# Patient Record
Sex: FEMALE | Race: BLACK OR AFRICAN AMERICAN | Employment: PART TIME | URBAN - METROPOLITAN AREA
[De-identification: names, ages, dates, MRNs, and addresses within clinical notes are randomized per-mention and may not be internally consistent; named-entity substitution may affect disease eponyms.]

---

## 2018-01-26 PROBLEM — Z34.90 PREGNANCY: Status: ACTIVE | Noted: 2018-01-26

## 2018-01-29 PROBLEM — O26.899 RH NEGATIVE STATUS DURING PREGNANCY: Status: ACTIVE | Noted: 2018-01-29

## 2018-01-29 PROBLEM — Z67.91 RH NEGATIVE STATUS DURING PREGNANCY: Status: ACTIVE | Noted: 2018-01-29

## 2018-07-07 ENCOUNTER — ANESTHESIA (OUTPATIENT)
Dept: LABOR AND DELIVERY | Age: 28
End: 2018-07-07
Payer: COMMERCIAL

## 2018-07-07 ENCOUNTER — HOSPITAL ENCOUNTER (INPATIENT)
Age: 28
LOS: 2 days | Discharge: HOME | End: 2018-07-09
Attending: OBSTETRICS & GYNECOLOGY | Admitting: OBSTETRICS & GYNECOLOGY
Payer: COMMERCIAL

## 2018-07-07 ENCOUNTER — ANESTHESIA EVENT (OUTPATIENT)
Dept: LABOR AND DELIVERY | Age: 28
End: 2018-07-07
Payer: COMMERCIAL

## 2018-07-07 PROBLEM — Z37.9 NORMAL LABOR: Status: ACTIVE | Noted: 2018-07-07

## 2018-07-07 LAB
ABO + RH BLD: NORMAL
BASE DEFICIT BLDCOA-SCNC: 4.5 MMOL/L (ref 0–2)
BASE DEFICIT BLDCOV-SCNC: 3.5 MMOL/L (ref 1.9–7.7)
BDY SITE: ABNORMAL
BDY SITE: ABNORMAL
BLOOD BANK CMNT PATIENT-IMP: NORMAL
BLOOD GROUP ANTIBODIES SERPL: NORMAL
ERYTHROCYTE [DISTWIDTH] IN BLOOD BY AUTOMATED COUNT: 13.6 % (ref 11.9–14.6)
FETAL SCREEN,FMHS: NORMAL
HCO3 BLDCOA-SCNC: 24 MMOL/L (ref 22–26)
HCO3 BLDV-SCNC: 22 MMOL/L
HCT VFR BLD AUTO: 32.9 % (ref 35.8–46.3)
HGB BLD-MCNC: 10.5 G/DL (ref 11.7–15.4)
MCH RBC QN AUTO: 24.4 PG (ref 26.1–32.9)
MCHC RBC AUTO-ENTMCNC: 31.9 G/DL (ref 31.4–35)
MCV RBC AUTO: 76.3 FL (ref 79.6–97.8)
PCO2 BLDCOA: 58 MMHG (ref 33–49)
PCO2 BLDCOV: 41 MMHG (ref 14.1–43.3)
PH BLDCOA: 7.23 [PH] (ref 7.21–7.31)
PH BLDCOV: 7.34 [PH] (ref 7.2–7.44)
PLATELET # BLD AUTO: 226 K/UL (ref 150–450)
PMV BLD AUTO: 12.2 FL (ref 10.8–14.1)
PO2 BLDCOA: <10 MMHG (ref 9–19)
PO2 BLDV: 18 MMHG (ref 30.4–57.2)
RBC # BLD AUTO: 4.31 M/UL (ref 4.05–5.25)
SERVICE CMNT-IMP: ABNORMAL
SERVICE CMNT-IMP: ABNORMAL
SPECIMEN EXP DATE BLD: NORMAL
WBC # BLD AUTO: 10.8 K/UL (ref 4.3–11.1)

## 2018-07-07 PROCEDURE — 74011250636 HC RX REV CODE- 250/636

## 2018-07-07 PROCEDURE — 76060000078 HC EPIDURAL ANESTHESIA: Performed by: OBSTETRICS & GYNECOLOGY

## 2018-07-07 PROCEDURE — 75410000003 HC RECOV DEL/VAG/CSECN EA 0.5 HR: Performed by: OBSTETRICS & GYNECOLOGY

## 2018-07-07 PROCEDURE — 36415 COLL VENOUS BLD VENIPUNCTURE: CPT | Performed by: OBSTETRICS & GYNECOLOGY

## 2018-07-07 PROCEDURE — 10907ZC DRAINAGE OF AMNIOTIC FLUID, THERAPEUTIC FROM PRODUCTS OF CONCEPTION, VIA NATURAL OR ARTIFICIAL OPENING: ICD-10-PCS | Performed by: OBSTETRICS & GYNECOLOGY

## 2018-07-07 PROCEDURE — 75410000000 HC DELIVERY VAGINAL/SINGLE: Performed by: OBSTETRICS & GYNECOLOGY

## 2018-07-07 PROCEDURE — 74011258636 HC RX REV CODE- 258/636: Performed by: OBSTETRICS & GYNECOLOGY

## 2018-07-07 PROCEDURE — 77030014125 HC TY EPDRL BBMI -B: Performed by: NURSE ANESTHETIST, CERTIFIED REGISTERED

## 2018-07-07 PROCEDURE — A4300 CATH IMPL VASC ACCESS PORTAL: HCPCS | Performed by: NURSE ANESTHETIST, CERTIFIED REGISTERED

## 2018-07-07 PROCEDURE — 82803 BLOOD GASES ANY COMBINATION: CPT

## 2018-07-07 PROCEDURE — 75410000002 HC LABOR FEE PER 1 HR: Performed by: OBSTETRICS & GYNECOLOGY

## 2018-07-07 PROCEDURE — 4A1HXCZ MONITORING OF PRODUCTS OF CONCEPTION, CARDIAC RATE, EXTERNAL APPROACH: ICD-10-PCS | Performed by: OBSTETRICS & GYNECOLOGY

## 2018-07-07 PROCEDURE — 86900 BLOOD TYPING SEROLOGIC ABO: CPT | Performed by: OBSTETRICS & GYNECOLOGY

## 2018-07-07 PROCEDURE — 74011000250 HC RX REV CODE- 250

## 2018-07-07 PROCEDURE — 74011250637 HC RX REV CODE- 250/637: Performed by: OBSTETRICS & GYNECOLOGY

## 2018-07-07 PROCEDURE — 99283 EMERGENCY DEPT VISIT LOW MDM: CPT

## 2018-07-07 PROCEDURE — 85461 HEMOGLOBIN FETAL: CPT | Performed by: OBSTETRICS & GYNECOLOGY

## 2018-07-07 PROCEDURE — 74011250636 HC RX REV CODE- 250/636: Performed by: OBSTETRICS & GYNECOLOGY

## 2018-07-07 PROCEDURE — 85027 COMPLETE CBC AUTOMATED: CPT | Performed by: OBSTETRICS & GYNECOLOGY

## 2018-07-07 PROCEDURE — 65270000029 HC RM PRIVATE

## 2018-07-07 PROCEDURE — 59025 FETAL NON-STRESS TEST: CPT

## 2018-07-07 RX ORDER — EPHEDRINE SULFATE 50 MG/ML
INJECTION, SOLUTION INTRAVENOUS AS NEEDED
Status: DISCONTINUED | OUTPATIENT
Start: 2018-07-07 | End: 2018-07-07 | Stop reason: HOSPADM

## 2018-07-07 RX ORDER — DOCUSATE SODIUM 100 MG/1
100 CAPSULE, LIQUID FILLED ORAL 2 TIMES DAILY
Status: DISCONTINUED | OUTPATIENT
Start: 2018-07-07 | End: 2018-07-09 | Stop reason: HOSPADM

## 2018-07-07 RX ORDER — ONDANSETRON 2 MG/ML
4 INJECTION INTRAMUSCULAR; INTRAVENOUS
Status: DISCONTINUED | OUTPATIENT
Start: 2018-07-07 | End: 2018-07-07 | Stop reason: HOSPADM

## 2018-07-07 RX ORDER — SODIUM CHLORIDE 0.9 % (FLUSH) 0.9 %
5-10 SYRINGE (ML) INJECTION EVERY 8 HOURS
Status: DISCONTINUED | OUTPATIENT
Start: 2018-07-07 | End: 2018-07-07 | Stop reason: HOSPADM

## 2018-07-07 RX ORDER — OXYTOCIN/0.9 % SODIUM CHLORIDE 15/250 ML
250 PLASTIC BAG, INJECTION (ML) INTRAVENOUS ONCE
Status: DISCONTINUED | OUTPATIENT
Start: 2018-07-07 | End: 2018-07-07

## 2018-07-07 RX ORDER — LIDOCAINE HYDROCHLORIDE 10 MG/ML
1 INJECTION INFILTRATION; PERINEURAL
Status: DISCONTINUED | OUTPATIENT
Start: 2018-07-07 | End: 2018-07-07 | Stop reason: HOSPADM

## 2018-07-07 RX ORDER — FAMOTIDINE 20 MG/1
20 TABLET, FILM COATED ORAL ONCE
Status: DISCONTINUED | OUTPATIENT
Start: 2018-07-07 | End: 2018-07-07 | Stop reason: HOSPADM

## 2018-07-07 RX ORDER — ONDANSETRON 4 MG/1
4 TABLET, ORALLY DISINTEGRATING ORAL
Status: ACTIVE | OUTPATIENT
Start: 2018-07-07 | End: 2018-07-08

## 2018-07-07 RX ORDER — DEXTROSE, SODIUM CHLORIDE, SODIUM LACTATE, POTASSIUM CHLORIDE, AND CALCIUM CHLORIDE 5; .6; .31; .03; .02 G/100ML; G/100ML; G/100ML; G/100ML; G/100ML
125 INJECTION, SOLUTION INTRAVENOUS CONTINUOUS
Status: DISCONTINUED | OUTPATIENT
Start: 2018-07-07 | End: 2018-07-07

## 2018-07-07 RX ORDER — CHLOROPROCAINE HYDROCHLORIDE 20 MG/ML
INJECTION, SOLUTION EPIDURAL; INFILTRATION; INTRACAUDAL; PERINEURAL AS NEEDED
Status: DISCONTINUED | OUTPATIENT
Start: 2018-07-07 | End: 2018-07-07 | Stop reason: HOSPADM

## 2018-07-07 RX ORDER — IBUPROFEN 400 MG/1
400 TABLET ORAL
Status: DISCONTINUED | OUTPATIENT
Start: 2018-07-07 | End: 2018-07-09 | Stop reason: HOSPADM

## 2018-07-07 RX ORDER — HYDROCODONE BITARTRATE AND ACETAMINOPHEN 5; 325 MG/1; MG/1
1 TABLET ORAL
Status: DISCONTINUED | OUTPATIENT
Start: 2018-07-07 | End: 2018-07-09 | Stop reason: HOSPADM

## 2018-07-07 RX ORDER — DIPHENHYDRAMINE HCL 25 MG
25 CAPSULE ORAL
Status: DISCONTINUED | OUTPATIENT
Start: 2018-07-07 | End: 2018-07-09 | Stop reason: HOSPADM

## 2018-07-07 RX ORDER — MINERAL OIL
120 OIL (ML) ORAL
Status: DISCONTINUED | OUTPATIENT
Start: 2018-07-07 | End: 2018-07-07 | Stop reason: HOSPADM

## 2018-07-07 RX ORDER — LIDOCAINE HYDROCHLORIDE 20 MG/ML
JELLY TOPICAL
Status: DISCONTINUED | OUTPATIENT
Start: 2018-07-07 | End: 2018-07-07 | Stop reason: HOSPADM

## 2018-07-07 RX ORDER — OXYCODONE AND ACETAMINOPHEN 7.5; 325 MG/1; MG/1
1 TABLET ORAL
Status: DISCONTINUED | OUTPATIENT
Start: 2018-07-07 | End: 2018-07-09 | Stop reason: HOSPADM

## 2018-07-07 RX ORDER — SIMETHICONE 80 MG
80 TABLET,CHEWABLE ORAL
Status: DISCONTINUED | OUTPATIENT
Start: 2018-07-07 | End: 2018-07-09 | Stop reason: HOSPADM

## 2018-07-07 RX ORDER — SODIUM CHLORIDE 0.9 % (FLUSH) 0.9 %
5-10 SYRINGE (ML) INJECTION AS NEEDED
Status: DISCONTINUED | OUTPATIENT
Start: 2018-07-07 | End: 2018-07-07 | Stop reason: HOSPADM

## 2018-07-07 RX ORDER — SODIUM CHLORIDE 0.9 % (FLUSH) 0.9 %
5-10 SYRINGE (ML) INJECTION AS NEEDED
Status: DISCONTINUED | OUTPATIENT
Start: 2018-07-07 | End: 2018-07-07

## 2018-07-07 RX ORDER — OXYTOCIN/RINGER'S LACTATE 30/500 ML
.5-2 PLASTIC BAG, INJECTION (ML) INTRAVENOUS
Status: DISCONTINUED | OUTPATIENT
Start: 2018-07-07 | End: 2018-07-09 | Stop reason: HOSPADM

## 2018-07-07 RX ORDER — BUTORPHANOL TARTRATE 1 MG/ML
1 INJECTION INTRAMUSCULAR; INTRAVENOUS
Status: DISCONTINUED | OUTPATIENT
Start: 2018-07-07 | End: 2018-07-07 | Stop reason: HOSPADM

## 2018-07-07 RX ORDER — OXYTOCIN/0.9 % SODIUM CHLORIDE 15/250 ML
250 PLASTIC BAG, INJECTION (ML) INTRAVENOUS ONCE
Status: ACTIVE | OUTPATIENT
Start: 2018-07-07 | End: 2018-07-08

## 2018-07-07 RX ORDER — PROMETHAZINE HYDROCHLORIDE 25 MG/1
25 TABLET ORAL
Status: DISCONTINUED | OUTPATIENT
Start: 2018-07-07 | End: 2018-07-09 | Stop reason: HOSPADM

## 2018-07-07 RX ORDER — IBUPROFEN 800 MG/1
800 TABLET ORAL
Status: DISCONTINUED | OUTPATIENT
Start: 2018-07-07 | End: 2018-07-09 | Stop reason: HOSPADM

## 2018-07-07 RX ORDER — SODIUM CHLORIDE 0.9 % (FLUSH) 0.9 %
5-10 SYRINGE (ML) INJECTION EVERY 8 HOURS
Status: DISCONTINUED | OUTPATIENT
Start: 2018-07-07 | End: 2018-07-07

## 2018-07-07 RX ORDER — PRENATAL VIT 96/IRON FUM/FOLIC 27MG-0.8MG
1 TABLET ORAL DAILY
Status: DISCONTINUED | OUTPATIENT
Start: 2018-07-08 | End: 2018-07-09 | Stop reason: HOSPADM

## 2018-07-07 RX ADMIN — EPHEDRINE SULFATE 10 MG: 50 INJECTION, SOLUTION INTRAVENOUS at 17:15

## 2018-07-07 RX ADMIN — SODIUM CHLORIDE, SODIUM LACTATE, POTASSIUM CHLORIDE, AND CALCIUM CHLORIDE 1000 ML: 600; 310; 30; 20 INJECTION, SOLUTION INTRAVENOUS at 16:09

## 2018-07-07 RX ADMIN — SODIUM CHLORIDE, SODIUM LACTATE, POTASSIUM CHLORIDE, CALCIUM CHLORIDE, AND DEXTROSE MONOHYDRATE 125 ML/HR: 600; 310; 30; 20; 5 INJECTION, SOLUTION INTRAVENOUS at 16:09

## 2018-07-07 RX ADMIN — EPHEDRINE SULFATE 10 MG: 50 INJECTION, SOLUTION INTRAVENOUS at 17:12

## 2018-07-07 RX ADMIN — IBUPROFEN 800 MG: 800 TABLET ORAL at 19:32

## 2018-07-07 RX ADMIN — CHLOROPROCAINE HYDROCHLORIDE 10 ML: 20 INJECTION, SOLUTION EPIDURAL; INFILTRATION; INTRACAUDAL; PERINEURAL at 16:48

## 2018-07-07 RX ADMIN — EPHEDRINE SULFATE 15 MG: 50 INJECTION, SOLUTION INTRAVENOUS at 17:07

## 2018-07-07 NOTE — IP AVS SNAPSHOT
[id number] 
[phone] Patient: Simon Brunson MRN: [id number] AYA:[id number]/71/[id number] About your hospitalization You were admitted on:  July 7, 2018 You last received care in the:  [address] [facility] You were discharged on:  July 9, 2018 Why you were hospitalized Your primary diagnosis was:  Shortness Of Breath Your diagnoses also included:  Normal Labor Follow-up Information Follow up With Details Comments Contact Info None   None ([phone]) Patient stated that they have no PCP Galina Ford MD In 2 weeks Call and schedule an appointment for a 2 week follow up at [address] [facility] [address] [facility] [address] [phone] Discharge Orders None A check heather indicates which time of day the medication should be taken. My Medications START taking these medications Instructions Each Dose to Equal  
 Morning Noon Evening Bedtime  
 ibuprofen 800 mg tablet Commonly known as:  MOTRIN Your last dose was: Your next dose is: Take 1 Tab by mouth every six (6) hours as needed. 800 mg CONTINUE taking these medications Instructions Each Dose to Equal  
 Morning Noon Evening Bedtime PRENATAL DHA+COMPLETE PRENATAL -300 mg-mcg-mg Cmpk Generic drug:  [id number] kim-folic-dha Your last dose was: Your next dose is: Take  by mouth. Where to Get Your Medications These medications were sent to [address] [place] [address] AT [facility] of [address] ( [address]) & [address] [address] Phone:  [phone]  
  ibuprofen 800 mg tablet Discharge Instructions Vaginal Childbirth: Care Instructions Your Care Instructions Your body will slowly heal in the next few weeks. It is easy to get too tired and overwhelmed during the first weeks after your baby is born. Changes in your hormones can shift your mood without warning. You may find it hard to meet the extra demands on your energy and time. Take it easy on yourself. Follow-up care is a key part of your treatment and safety. Be sure to make and go to all appointments, and call your doctor if you are having problems. It's also a good idea to know your test results and keep a list of the medicines you take. How can you care for yourself at home? · Vaginal bleeding and cramps ¨ After delivery, you will have a bloody discharge from the vagina. This will turn pink within a week and then white or yellow after about 10 days. It may last for 2 to 4 weeks or longer, until the uterus has healed. Use pads instead of tampons until you stop bleeding. ¨ Do not worry if you pass some blood clots, as long as they are smaller than a golf ball. If you have a tear or stitches in your vaginal area, change the pad at least every 4 hours to prevent soreness and infection. ¨ You may have cramps for the first few days after childbirth. These are normal and occur as the uterus shrinks to normal size. Take an over-the-counter pain medicine, such as acetaminophen (Tylenol), ibuprofen (Advil, Motrin), or naproxen (Aleve), for cramps. Read and follow all instructions on the label. Do not take aspirin, because it can cause more bleeding. ¨ Do not take two or more pain medicines at the same time unless the doctor told you to. Many pain medicines have acetaminophen, which is Tylenol. Too much acetaminophen (Tylenol) can be harmful. · Stitches ¨ If you have stitches, they will dissolve on their own and do not need to be removed. Follow your doctor's instructions for cleaning the stitched area.  
¨ Put ice or a cold pack on your painful area for 10 to 20 minutes at a time, several times a day, for the first few days. Put a thin cloth between the ice and your skin. ¨ Sit in a few inches of warm water (sitz bath) 3 times a day and after bowel movements. The warm water helps with pain and itching. If you do not have a tub, a warm shower might help. · Breast fullness ¨ Your breasts may overfill (engorge) in the first few days after delivery. To help milk flow and to relieve pain, warm your breasts in the shower or by using warm, moist towels before nursing. ¨ If you are not nursing, do not put warmth on your breasts or touch your breasts. Wear a tight bra or sports bra and use ice until the fullness goes away. This usually takes 2 to 3 days. ¨ Put ice or a cold pack on your breast after nursing to reduce swelling and pain. Put a thin cloth between the ice and your skin. · Activity ¨ Eat a balanced diet. Do not try to lose weight by cutting calories. Keep taking your prenatal vitamins, or take a multivitamin. ¨ Get as much rest as you can. Try to take naps when your baby sleeps during the day. ¨ Get some exercise every day. But do not do any heavy exercise until your doctor says it is okay. ¨ Wait until you are healed (about 4 to 6 weeks) before you have sexual intercourse. Your doctor will tell you when it is okay to have sex. ¨ Talk to your doctor about birth control. You can get pregnant even before your period returns. Also, you can get pregnant while you are breastfeeding. · Mental health ¨ It is normal to have some sadness, anxiety, sleeplessness, and mood swings after you go home. If you feel upset or hopeless for more than a few days or are having trouble doing the things you need to do, talk to your doctor. · Constipation and hemorrhoids ¨ Drink plenty of fluids, enough so that your urine is light yellow or clear like water.  If you have kidney, heart, or liver disease and have to limit fluids, talk with your doctor before you increase the amount of fluids you drink. ¨ Eat plenty of fiber each day. Have a bran muffin or bran cereal for breakfast, and try eating a piece of fruit for a mid-afternoon snack. ¨ For painful, itchy hemorrhoids, put ice or a cold pack on the area several times a day for 10 minutes at a time. Follow this by putting a warm compress on the area for another 10 to 20 minutes or by sitting in a shallow, warm bath. When should you call for help? Call 911 anytime you think you may need emergency care. For example, call if: 
? · You passed out (lost consciousness). ?Call your doctor now or seek immediate medical care if: 
? · You have severe vaginal bleeding. ? · You are dizzy or lightheaded, or you feel like you may faint. ? · You have a fever. ? · You have new or more pain in your belly or pelvis. ? Watch closely for changes in your health, and be sure to contact your doctor if: 
? · Your vaginal bleeding seems to be getting heavier. ? · You have new or worse vaginal discharge. ? · You feel sad, anxious, or hopeless for more than a few days. ? · You do not get better as expected. Where can you learn more? Go to http://reece-medardo.info/. Enter V880 in the search box to learn more about \"Vaginal Childbirth: Care Instructions. \" Current as of: March 16, 2017 Content Version: 11.4 © 1951-1536 [employer]. Care instructions adapted under license by [employer] (which disclaims liability or warranty for this information). If you have questions about a medical condition or this instruction, always ask your healthcare professional. [place] [address] any warranty or liability for your use of this information. [employer] Announcement We are excited to announce that we are making your provider's discharge notes available to you in [employer].   You will see these notes when they are completed and signed by the physician that discharged you from your recent hospital stay. If you have any questions or concerns about any information you see in Conergy, please call the Health Information Department where you were seen or reach out to your Primary Care Provider for more information about your plan of care. Introducing [facility]! Dear Wes Traylor: Thank you for requesting a Conergy account. Our records indicate that you already have an active Conergy account. You can access your account anytime at [email or web address]/"[email or web address] Did you know that you can access your hospital and ER discharge instructions at any time in Conergy? You can also review all of your test results from your hospital stay or ER visit. Additional Information If you have questions, please visit the Frequently Asked Questions section of the Conergy website at [email or web address] Remember, Conergy is NOT to be used for urgent needs. For medical emergencies, dial 911. Now available from your iPhone and Android! Introducing Luís Garner As a [employer] patient, I wanted to make you aware of our electronic visit tool called Luís Garner. [employer] 24/7 allows you to connect within minutes with a medical provider 24 hours a day, seven days a week via a mobile device or tablet or logging into a secure website from your computer. You can access Luís Garner from anywhere in the [place]. A virtual visit might be right for you when you have a simple condition and feel like you just dont want to get out of bed, or cant get away from work for an appointment, when your regular [employer] provider is not available (evenings, weekends or holidays), or when youre out of town and need minor care. Electronic visits cost only $49 and if the [employer] 24/7 provider determines a prescription is needed to treat your condition, one can be electronically transmitted to a nearby pharmacy*. Please take a moment to enroll today if you have not already done so. The enrollment process is free and takes just a few minutes. To enroll, please download the [employer] 24/7 rico to your tablet or phone, or visit [email or web address] to enroll on your computer. And, as an [address] [place] [address] patient with a raksul account, the results of your visits will be scanned into your electronic medical record and your primary care provider will be able to view the scanned results. We urge you to continue to see your regular [employer] provider for your ongoing medical care. And while your primary care provider may not be the one available when you seek a Inaura virtual visit, the peace of mind you get from getting a real diagnosis real time can be priceless. For more information on Inaura, view our Frequently Asked Questions (FAQs) at [email or web address]. Sincerely, 
 
Abdirashid Erazo MD 
Chief Medical Officer [address] [place] [address] *:  certain medications cannot be prescribed via Inaura Providers Seen During Your Hospitalization Provider Specialty Primary office phone Vira Barajas MD Obstetrics & Gynecology [phone] Immunizations Administered for This Admission Name Date Rho(D) Immune Globulin - IM 7/8/2018 Your Primary Care Physician (PCP) Primary Care Physician Office Phone Office Fax NONE ** None ** ** None ** You are allergic to the following No active allergies Recent Documentation Height Weight Breastfeeding? BMI OB Status Smoking Status 1.575 m 74.4 kg Unknown 30 kg/m2 Recent pregnancy Never Smoker Emergency Contacts Name Discharge Info Relation Home Work Mobile Autumn Chun  Spouse [phone] Patient Belongings The following personal items are in your possession at time of discharge: Please provide this summary of care documentation to your next provider. Signatures-by signing, you are acknowledging that this After Visit Summary has been reviewed with you and you have received a copy. Patient Signature:  ____________________________________________________________ Date:  ____________________________________________________________  
  
Sofiya Moulds Provider Signature:  ____________________________________________________________ Date:  ____________________________________________________________

## 2018-07-07 NOTE — ANESTHESIA PREPROCEDURE EVALUATION
Anesthetic History Review of Systems / Medical History Patient summary reviewed, nursing notes reviewed and pertinent labs reviewed Pulmonary Neuro/Psych Cardiovascular Exercise tolerance: >4 METS 
  
GI/Hepatic/Renal 
  
 
 
 
 
 
 Endo/Other Other Findings Physical Exam 
 
Airway Mallampati: II 
TM Distance: 4 - 6 cm Neck ROM: normal range of motion Mouth opening: Normal 
 
 Cardiovascular Regular rate and rhythm,  S1 and S2 normal,  no murmur, click, rub, or gallop Dental 
No notable dental hx Pulmonary Breath sounds clear to auscultation Abdominal 
 
 
 
 Other Findings Anesthetic Plan ASA: 2 Anesthesia type: epidural 
 
 
 
 
Induction: Intravenous Anesthetic plan and risks discussed with: Patient

## 2018-07-07 NOTE — ANESTHESIA POSTPROCEDURE EVALUATION
Post-Anesthesia Evaluation and Assessment Patient: Dick Burnett MRN: [id number]  SSN: [id number] YOB: 1990  Age: 33 y.o. Sex: female Cardiovascular Function/Vital Signs Visit Vitals  /81 (BP 1 Location: Right arm, BP Patient Position: At rest;Sitting)  Pulse 68  Resp 18  Ht 5' 2\" (1.575 m)  Wt 74.4 kg (164 lb)  Breastfeeding Unknown  BMI 30 kg/m2 Patient is status post epidural anesthesia for * No procedures listed *. Nausea/Vomiting: None Postoperative hydration reviewed and adequate. Pain: 
Pain Scale 1: Numeric (0 - 10) (07/07/18 1532) Pain Intensity 1: 8 (07/07/18 1532) Managed Neurological Status: At baseline Mental Status and Level of Consciousness: Arousable Pulmonary Status:  
O2 Device: Room air (07/07/18 1532) Adequate oxygenation and airway patent Complications related to anesthesia: None Post-anesthesia assessment completed. No concerns Signed By: Emerita Esposito MD   
 July 7, 2018

## 2018-07-07 NOTE — L&D DELIVERY NOTE
Delivery Summary    Patient: Padmini Dong MRN: [id number]  SSN: [id number]    YOB: 1990  Age: 33 y.o. Sex: female       Information for the patient's :  Soledad Villasenor [[id number]]       Labor Events:    Labor: No   Rupture Date:     Rupture Time:     Rupture Type     Amniotic Fluid Volume: Moderate    Amniotic Fluid Description:       Induction: None       Augmentation: None   Labor Events: None     Cervical Ripening:     None     Delivery Events:  Episiotomy: None   Laceration(s): None     Repaired: None    Number of Repair Packets:     Suture Type and Size: None     Estimated Blood Loss (ml): 200ml       Delivery Date: 2018    Delivery Time: 4:53 PM  Delivery Type: Vaginal, Spontaneous Delivery  Sex:  Male     Gestational Age: 39w0d   Delivery Clinician:  Sariah Byrnes  Living Status: Living   Delivery Location: [facility]          APGARS  One minute Five minutes Ten minutes   Skin color: 0   1        Heart rate: 2   2        Grimace: 2   2        Muscle tone: 2   2        Breathin   2        Totals: 7   9            Presentation: Vertex    Position: Left Occiput Anterior  Resuscitation Method:  Tactile Stimulation;Suctioning-bulb     Meconium Stained: None      Cord Vessels: 3 Vessels      Cord Events:    Cord Blood Sent?:  Yes    Blood Gases Sent?:  Yes    Placenta:  Date/Time:   5:00 PM  Removal: Spontaneous      Appearance: Normal     [place] Measurements:  Birth Weight: 2.73 kg      Birth Length: 51 cm      Head Circumference: 33 cm      Chest Circumference: 30 cm     Abdominal Girth:       Other Providers:   Margie ESTRADA;;;;;;;;José UREÑA, Obstetrician;Primary Nurse;Primary [place] Nurse;Nicu Nurse;Neonatologist;Anesthesiologist;Crna;Nurse Practitioner;Midwife;Nursery Nurse;Scrub Tech           Group B Strep:   Lab Results   Component Value Date/Time    GrBStrep, External neg 2018     Information for the patient's :  Gilda Haddad [[id number]]   No results found for: Zettie Lidia, ABORHEXT, ABORH    Lab Results   Component Value Date/Time    APH 7.233 2018 04:53 PM    APCO2 58 (H) 2018 04:53 PM    APO2 <10 2018 04:53 PM    AHCO3 24 2018 04:53 PM    ABDC 4.5 (H) 2018 04:53 PM    EPHV 7.343 2018 04:53 PM    PCO2V 41 2018 04:53 PM    PO2V 18 (L) 2018 04:53 PM    HCO3V 22 2018 04:53 PM    EBDV 3.5 2018 04:53 PM    SITE CORD 2018 04:53 PM    SITE CORD 2018 04:53 PM    RSCOM na at 2018 5 04 03 PM. Not read back. 2018 04:53 PM    RSCOM na at 2018 5 06 18 PM. Not read back.  2018 04:53 PM

## 2018-07-07 NOTE — ANESTHESIA PROCEDURE NOTES
Epidural Block Start time: 7/7/2018 4:36 PM 
End time: 7/7/2018 4:46 PM 
Performed by: Aracelis Leone Authorized by: Nanette Andre Indication: labor epidural   
Preanesthetic Checklist: patient identified, risks and benefits discussed, anesthesia consent, site marked, patient being monitored, timeout performed and anesthesia consent Timeout Time: 16:36 Epidural:  
Patient position:  Seated Prep region:  Lumbar Prep: Chlorhexidine Location:  L3-4 Needle and Epidural Catheter:  
Needle Type:  Tuohy Needle Gauge:  17 G Injection Technique:  Loss of resistance using air Attempts:  1 Catheter Size:  18 G Depth in Epidural Space (cm):  3 Events: no blood with aspiration, no cerebrospinal fluid with aspiration, no paresthesia and negative aspiration test   
Test Dose:  Negative and lidocaine 1.5% w/ epi (.75% LIDOCAINE WITH EPI) Assessment:  
Catheter Secured:  Tegaderm and tape Insertion:  Uncomplicated Patient tolerance:  Patient tolerated the procedure well with no immediate complications

## 2018-07-07 NOTE — PROGRESS NOTES
SBAR OUT Report: Mother Verbal report given to maternity RN on this patient, who is now being transferred to MIU (unit) for routine progression of care. The patient is not wearing a green \"Anesthesia-Duramorph\" band. Report consisted of patient's Situation, Background, Assessment and Recommendations (SBAR).  ID bands were compared with the identification form, and verified with the patient and receiving nurse.  
 
Information from the SBAR, Intake/Output and MAR

## 2018-07-07 NOTE — PROGRESS NOTES
of a male infant. Apgars 7/9. C/C/+2-->pushed to deliver head (KVNG) onto intact perineum. Body followed easily thereafter. Cord cut/clamped, baby to mom. Cord gases were drawn. Placenta delivered S/I/3VC. No lac noted. FF w/ pit and massage. EBL 300mls. Mom/baby stable.

## 2018-07-07 NOTE — H&P
History & Physical 
 
Name: Abigail Castillo MRN: [id number]  SSN: [id number] YOB: 1990  Age: 33 y.o. Sex: female Subjective: Pt presents in labor. She notes good FM. She denies VB, LOF, or preeclamptic symptoms. Estimated Date of Delivery: 18 OB History  Para Term  AB Living 3 2 2   2 SAB TAB Ectopic Molar Multiple Live Births 2 # Outcome Date GA Lbr Alan/2nd Weight Sex Delivery Anes PTL Lv  
3 Current 2 Term 02/15/15 40w0d  2.807 kg M Vag-Spont   NABILA Birth Comments: Amelia Ramos 1 Term 13 40w0d  2.268 kg F Vag-Spont   NABILA Birth Comments: Amelia Díaz is seen with pregnancy at 39w0d for active labor. Prenatal course was normal. 
the patients states that the baby moves as usual 
 Please see prenatal records for details. History reviewed. No pertinent past medical history. History reviewed. No pertinent surgical history. Social History Occupational History  Not on file. Social History Main Topics  Smoking status: Never Smoker  Smokeless tobacco: Never Used  Alcohol use No  
 Drug use: No  
 Sexual activity: Yes  
  Partners: Male Family History Problem Relation Age of Onset  Diabetes Mother  Anxiety Mother  No Known Problems Father  Crohn's Disease Brother  Diabetes Maternal Grandmother  Diabetes Maternal Grandfather  Diabetes Paternal Grandmother  Diabetes Paternal Grandfather No Known Allergies Prior to Admission medications Medication Sig Start Date End Date Taking? Authorizing Provider  
[id number] kim-folic-dha (PRENATAL DHA+COMPLETE PRENATAL) [id number] mg-mcg-mg cmpk Take  by mouth. Historical Provider Review of Systems: 
Constitutional:No headache, fever Cardiac:   No chest pain Resp: No cough or shortness of breath GI:   No nausea/vomiting, diarrhea, abdominal pain :   No dysuria Neuro:     No vision changes, headache Objective:  
 
Vitals: 
Vitals:  
 18 1531 18 1532 BP:  135/81 Pulse:  68 Resp:  18 Weight: 74.4 kg (164 lb) Height: 5' 2\" (1.575 m) Physical Exam: 
Patient with distress. Heart: Regular rate and rhythm Lung: clear to auscultation throughout lung fields, no wheezes, no rales, no rhonchi and normal respiratory effort Back: costovertebral angle tenderness absent Abdomen: soft, nontender Fundus: soft and non tender Cervical Exam: 5cm/80%/vtc/-2 Lower Extremities: no evidence of DVT Membranes:  Intact Fetal Heart Rate: Baseline: 145 per minute Variability: moderate Accelerations: yes Decelerations: none Uterine contractions: regular, every 4 minutes Prenatal Labs:  
Lab Results Component Value Date/Time  
 Rubella, External immune 2018 GrBStrep, External neg 2018 HBsAg, External negative 2018 HIV, External NR 2018 RPR, External NR 2018 Assessment/Plan: Ms. Adonay Chaidez is a  seen with pregnancy at 39w0d for active labor. Lab Results Component Value Date/Time GrBStrep, External neg 2018 Plan:  
 
Admit for labor management Patient discussed with Dr. Joel Mckay.

## 2018-07-07 NOTE — PROGRESS NOTES
Pt admitted to [address] [place], [place] [address]John Admission assessment completed. Discussed plan of care with patient. Discussed pt's choice of infant feeding method. Feeding options explored, including the importance of exclusive breastfeeding. Pt informed of our breastfeeding support system from from nursing staff and lactation staff during inpatient stay and following discharge. Questions and concerns addressed at this time. Pt confirms breastfeeding  is her decision at this time. Khoi Segovia

## 2018-07-07 NOTE — PROGRESS NOTES
Pt up to the bathroom to void at 1830. Pt's fundus is firm at this time. Pt was steady and able to void.

## 2018-07-07 NOTE — PROGRESS NOTES
[facility] by INEZ and care relinquished.

## 2018-07-08 PROBLEM — R06.02 SHORTNESS OF BREATH: Status: ACTIVE | Noted: 2018-07-08

## 2018-07-08 PROCEDURE — 74011250637 HC RX REV CODE- 250/637: Performed by: OBSTETRICS & GYNECOLOGY

## 2018-07-08 PROCEDURE — 74011250636 HC RX REV CODE- 250/636: Performed by: OBSTETRICS & GYNECOLOGY

## 2018-07-08 PROCEDURE — 65270000029 HC RM PRIVATE

## 2018-07-08 RX ADMIN — OXYCODONE HYDROCHLORIDE AND ACETAMINOPHEN 0.5 TABLET: 7.5; 325 TABLET ORAL at 08:30

## 2018-07-08 RX ADMIN — IBUPROFEN 800 MG: 800 TABLET ORAL at 02:29

## 2018-07-08 RX ADMIN — IBUPROFEN 800 MG: 800 TABLET ORAL at 15:29

## 2018-07-08 RX ADMIN — IBUPROFEN 800 MG: 800 TABLET ORAL at 08:30

## 2018-07-08 RX ADMIN — OXYCODONE HYDROCHLORIDE AND ACETAMINOPHEN 0.5 TABLET: 7.5; 325 TABLET ORAL at 20:01

## 2018-07-08 RX ADMIN — DOCUSATE SODIUM 100 MG: 100 CAPSULE, LIQUID FILLED ORAL at 20:02

## 2018-07-08 RX ADMIN — RHO(D) IMMUNE GLOBULIN (HUMAN) 0.3 MG: 1500 SOLUTION INTRAMUSCULAR at 08:32

## 2018-07-08 RX ADMIN — DOCUSATE SODIUM 100 MG: 100 CAPSULE, LIQUID FILLED ORAL at 08:31

## 2018-07-08 RX ADMIN — IBUPROFEN 800 MG: 800 TABLET ORAL at 21:27

## 2018-07-08 RX ADMIN — OXYCODONE HYDROCHLORIDE AND ACETAMINOPHEN 0.5 TABLET: 7.5; 325 TABLET ORAL at 15:29

## 2018-07-08 RX ADMIN — OXYCODONE HYDROCHLORIDE AND ACETAMINOPHEN 0.5 TABLET: 7.5; 325 TABLET ORAL at 00:57

## 2018-07-08 NOTE — PROGRESS NOTES
Post-Partum Day Number 1 Progress Note    Patient doing well post-partum without significant complaint. Voiding withour difficulty, normal lochia. Vitals:  Patient Vitals for the past 8 hrs:   BP Temp Pulse Resp   18 0745 94/58 98.1 °F (36.7 °C) 66 18     Temp (24hrs), Av.8 °F (36.6 °C), Min:97.5 °F (36.4 °C), Max:98.1 °F (36.7 °C)      Vital signs stable, afebrile. Exam:  Patient without distress. Abdomen soft, fundus firm at level of umbilicus, nontender               Perineum with normal lochia noted. Lower extremities are negative for swelling, cords or tenderness. Lab/Data Review: All lab results for the last 24 hours reviewed. Assessment and Plan:  Patient appears to be having uncomplicated post-partum course. Continue routine perineal care and maternal education. Plan discharge tomorrow if no problems occur.

## 2018-07-08 NOTE — LACTATION NOTE
In to see mom and infant for the first time. Experienced mom stated that infant is latching and nursing well. Reviewed the expectations of the first 24 hours as well as the second night. Mom stated that she feels confident and has no concerns at this time.

## 2018-07-08 NOTE — PROGRESS NOTES
AM assessment completed per protocol, plan of care discussed with patient. Percocet 7.5 mg, 1/2 tablet and motrin 800 mg given per patient request for complaints of pain. Instructed to call for any needs or questions. Voices understanding.

## 2018-07-08 NOTE — LACTATION NOTE

## 2018-07-08 NOTE — ROUTINE PROCESS
SBAR IN Report: Mother Verbal report received from [employer] (full name & credentials) on this patient, who is now being transferred from L&D (unit) for routine progression of care. The patient is not wearing a green \"Anesthesia-Duramorph\" band. Report consisted of patient's Situation, Background, Assessment and Recommendations (SBAR). [place] ID bands were compared with the identification form, and verified with the patient and transferring nurse. Information from the SBAR and Intake/Output and the [place]il[place] Report was reviewed with the transferring nurse; opportunity for questions and clarification provided.

## 2018-07-09 VITALS
DIASTOLIC BLOOD PRESSURE: 63 MMHG | TEMPERATURE: 98 F | RESPIRATION RATE: 18 BRPM | WEIGHT: 164 LBS | SYSTOLIC BLOOD PRESSURE: 124 MMHG | HEART RATE: 62 BPM | OXYGEN SATURATION: 98 % | BODY MASS INDEX: 30.18 KG/M2 | HEIGHT: 62 IN

## 2018-07-09 PROCEDURE — 74011250637 HC RX REV CODE- 250/637: Performed by: OBSTETRICS & GYNECOLOGY

## 2018-07-09 RX ORDER — IBUPROFEN 800 MG/1
800 TABLET ORAL
Qty: 30 TAB | Refills: 0 | Status: SHIPPED | OUTPATIENT
Start: 2018-07-09 | End: 2018-08-22 | Stop reason: ALTCHOICE

## 2018-07-09 RX ADMIN — DOCUSATE SODIUM 100 MG: 100 CAPSULE, LIQUID FILLED ORAL at 10:30

## 2018-07-09 RX ADMIN — IBUPROFEN 800 MG: 800 TABLET ORAL at 10:29

## 2018-07-09 RX ADMIN — IBUPROFEN 800 MG: 800 TABLET ORAL at 04:25

## 2018-07-09 RX ADMIN — OXYCODONE HYDROCHLORIDE AND ACETAMINOPHEN 0.5 TABLET: 7.5; 325 TABLET ORAL at 00:58

## 2018-07-09 NOTE — PROGRESS NOTES
Chart reviewed - no needs identified.  made introduction to family and provided informational packet on  mood disorder education/resources. Family receptive to receiving information and denied any additional needs from . Family has this 's contact information should any needs/questions arise.     Jenny Pena, [address] [facility]

## 2018-07-09 NOTE — PROGRESS NOTES
07/09/18 0100   Pain Assessment   Pain Scale 1 Numeric (0 - 10)   Pain Intensity 1 1   Pain Location 1 Abdomen   Pain Orientation 1 Anterior; Lower   Pain Description 1 Cramping   Pain Intervention(s) 1 Medication (see MAR)     Percocet 7.5-325mg 0.5 tab given for pain per pts request.

## 2018-07-09 NOTE — DISCHARGE INSTRUCTIONS
Vaginal Childbirth: Care Instructions  Your Care Instructions    Your body will slowly heal in the next few weeks. It is easy to get too tired and overwhelmed during the first weeks after your baby is born. Changes in your hormones can shift your mood without warning. You may find it hard to meet the extra demands on your energy and time. Take it easy on yourself. Follow-up care is a key part of your treatment and safety. Be sure to make and go to all appointments, and call your doctor if you are having problems. It's also a good idea to know your test results and keep a list of the medicines you take. How can you care for yourself at home? · Vaginal bleeding and cramps  ¨ After delivery, you will have a bloody discharge from the vagina. This will turn pink within a week and then white or yellow after about 10 days. It may last for 2 to 4 weeks or longer, until the uterus has healed. Use pads instead of tampons until you stop bleeding. ¨ Do not worry if you pass some blood clots, as long as they are smaller than a golf ball. If you have a tear or stitches in your vaginal area, change the pad at least every 4 hours to prevent soreness and infection. ¨ You may have cramps for the first few days after childbirth. These are normal and occur as the uterus shrinks to normal size. Take an over-the-counter pain medicine, such as acetaminophen (Tylenol), ibuprofen (Advil, Motrin), or naproxen (Aleve), for cramps. Read and follow all instructions on the label. Do not take aspirin, because it can cause more bleeding. ¨ Do not take two or more pain medicines at the same time unless the doctor told you to. Many pain medicines have acetaminophen, which is Tylenol. Too much acetaminophen (Tylenol) can be harmful. · Stitches  ¨ If you have stitches, they will dissolve on their own and do not need to be removed. Follow your doctor's instructions for cleaning the stitched area.   ¨ Put ice or a cold pack on your painful area for 10 to 20 minutes at a time, several times a day, for the first few days. Put a thin cloth between the ice and your skin. ¨ Sit in a few inches of warm water (sitz bath) 3 times a day and after bowel movements. The warm water helps with pain and itching. If you do not have a tub, a warm shower might help. · Breast fullness  ¨ Your breasts may overfill (engorge) in the first few days after delivery. To help milk flow and to relieve pain, warm your breasts in the shower or by using warm, moist towels before nursing. ¨ If you are not nursing, do not put warmth on your breasts or touch your breasts. Wear a tight bra or sports bra and use ice until the fullness goes away. This usually takes 2 to 3 days. ¨ Put ice or a cold pack on your breast after nursing to reduce swelling and pain. Put a thin cloth between the ice and your skin. · Activity  ¨ Eat a balanced diet. Do not try to lose weight by cutting calories. Keep taking your prenatal vitamins, or take a multivitamin. ¨ Get as much rest as you can. Try to take naps when your baby sleeps during the day. ¨ Get some exercise every day. But do not do any heavy exercise until your doctor says it is okay. ¨ Wait until you are healed (about 4 to 6 weeks) before you have sexual intercourse. Your doctor will tell you when it is okay to have sex. ¨ Talk to your doctor about birth control. You can get pregnant even before your period returns. Also, you can get pregnant while you are breastfeeding. · Mental health  ¨ It is normal to have some sadness, anxiety, sleeplessness, and mood swings after you go home. If you feel upset or hopeless for more than a few days or are having trouble doing the things you need to do, talk to your doctor. · Constipation and hemorrhoids  ¨ Drink plenty of fluids, enough so that your urine is light yellow or clear like water.  If you have kidney, heart, or liver disease and have to limit fluids, talk with your doctor before you increase the amount of fluids you drink. ¨ Eat plenty of fiber each day. Have a bran muffin or bran cereal for breakfast, and try eating a piece of fruit for a mid-afternoon snack. ¨ For painful, itchy hemorrhoids, put ice or a cold pack on the area several times a day for 10 minutes at a time. Follow this by putting a warm compress on the area for another 10 to 20 minutes or by sitting in a shallow, warm bath. When should you call for help? Call 911 anytime you think you may need emergency care. For example, call if:  ? · You passed out (lost consciousness). ?Call your doctor now or seek immediate medical care if:  ? · You have severe vaginal bleeding. ? · You are dizzy or lightheaded, or you feel like you may faint. ? · You have a fever. ? · You have new or more pain in your belly or pelvis. ? Watch closely for changes in your health, and be sure to contact your doctor if:  ? · Your vaginal bleeding seems to be getting heavier. ? · You have new or worse vaginal discharge. ? · You feel sad, anxious, or hopeless for more than a few days. ? · You do not get better as expected. Where can you learn more? Go to http://reece-medardo.info/. Enter M149 in the search box to learn more about \"Vaginal Childbirth: Care Instructions. \"  Current as of: March 16, 2017  Content Version: 11.4  © 4784-0836 [employer]. Care instructions adapted under license by [employer] (which disclaims liability or warranty for this information). If you have questions about a medical condition or this instruction, always ask your healthcare professional. [address] any warranty or liability for your use of this information.

## 2018-07-09 NOTE — PROGRESS NOTES
Pt discharged to home after ID bands verified and newborns code alert removed. Discharge teaching complete, pt verbalizes understanding; questions encouraged. Mom transferred by wheelchair to vehicle, while dad carried baby to car and placed in rear facing car seat. Stable at discharge.

## 2018-07-09 NOTE — DISCHARGE SUMMARY
Obstetrical Discharge Summary     Name: Eugene Boston MRN: [id number]  SSN: [id number]    YOB: 1990  Age: 33 y.o. Sex: female      Allergies: Review of patient's allergies indicates no known allergies. Admit Date: 2018    Discharge Date: 2018     Admitting Physician: Zeina Dillard MD     Attending Physician:  Meir Lind MD     * Admission Diagnoses: LABOR;Normal labor    * Discharge Diagnoses:   Information for the patient's :  Nick Harvey [[id number]]   Delivery of a 6 lb 0.3 oz (2.73 kg) male infant via Vaginal, Spontaneous Delivery on 2018 at 4:53 PM  by . Apgars were 7 and 9.        Additional Diagnoses:   Hospital Problems as of 2018  Date Reviewed: 2018          Codes Class Noted - Resolved POA    * (Principal)Shortness of breath ICD-10-CM: R06.02  ICD-9-CM: 786.05  2018 - Present Yes        Normal labor ICD-10-CM: O80, Z37.9  ICD-9-CM: 292  2018 - Present Unknown             Lab Results   Component Value Date/Time    ABO/Rh(D) B NEGATIVE 2018 04:10 PM    Rubella, External immune 2018    GrBStrep, External neg 2018    ABO,Rh B negative 2018      Immunization History   Administered Date(s) Administered    Influenza Vaccine (Quad) Mdck Pf 2018    Rho(D) Immune Globulin - IM 2018, 2018       * Procedures:   * No surgery found *      [place]  Depression Scale  I have been able to laugh and see the funny side of things: As much as I always could  I have looked forward with enjoyment to things: As much as I ever did  I have blamed myself unnecessarily when things went wrong: No, never  I have been anxious or worried for no good reason: No, not at all  I have felt scared or panicky for no very good reason: No, not at all  Things have been getting on top of me: No, most of the time I have coped quite well  I have been so unhappy that I have had difficulty sleeping: No, not at all  I have felt sad or miserable: Not very often  I have been so unhappy that I have been crying: Only occasionally  The thought of harming myself has occurred to me: Never  Total Score: 3    * Discharge Condition: good    [facility] Course: Normal hospital course following the delivery. * Disposition: Home    Discharge Medications:   Current Discharge Medication List      START taking these medications    Details   ibuprofen (MOTRIN) 800 mg tablet Take 1 Tab by mouth every six (6) hours as needed. Qty: 30 Tab, Refills: 0         CONTINUE these medications which have NOT CHANGED    Details   [id number] kim-folic-dha (PRENATAL DHA+COMPLETE PRENATAL) -300 mg-mcg-mg cmpk Take  by mouth. * Follow-up Care/Patient Instructions:   Activity: No sex for 6 weeks      Follow-up Information     Follow up With Details Comments Contact Info    None   None ([phone]) Patient stated that they have no PCP             Signed By:  Severo Desouza DO     July 9, 2018

## 2018-07-09 NOTE — LACTATION NOTE
This note was copied from a baby's chart. Mom and baby are going home today. Continue to offer the breast without restriction. Mom's milk should be fully in over the next few days. Reviewed engorgement precautions. Hand Expression has been demoed and written hand-out reviewed. As milk comes in baby will be more alert at the breast and swallows will be more obvious. Breasts may feel softer once baby has finished nursing. Baby should be back to birth weight by 3weeks of age. And then gain on average 1 oz per day for the next 2-3 months. Reviewed babies should be exclusively breastfeeding for the first 6 months and that breastfeeding should continue after introduction of appropriate complimentary foods after 6 months. Initial output should be at least 1 wet and 1 bowel movement for each day old baby is. By day 5-7 once milk is fully in baby will consistently have 6 or more soaking wet diapers and about 4 bowel movement. Some babies have a bowel movement with every feeding and some have 1-3 large bowel movements each day. Inadequate output may indicate inadequate feedings and should be reported to your Pediatrician. Bowel habits may change as baby gets older. Encouraged follow-up at Pediatrician in 1-2 days, no later than 1 week of age. Call [facility] for any questions as needed or to set up an OP visit. OP phone calls are returned within 24 hours. Community Breastfeeding Resource List given. Baby latched on left breast now, sleepy but just circumcised. Mom states milk coming in well today. Doing well. No questions or concerns. Experienced breastfeeder. Only small stool this morning, encouraged mom to watch output closely and follow up with MD as instructed in 1-2 days.

## 2018-07-09 NOTE — PROGRESS NOTES
07/09/18 0425   Pain Assessment   Pain Scale 1 Numeric (0 - 10)   Pain Intensity 1 1   Pain Location 1 Abdomen   Pain Orientation 1 Anterior   Pain Description 1 Cramping   Pain Intervention(s) 1 Medication (see MAR)     Motrin 800mg given for pain

## 2018-07-09 NOTE — PROGRESS NOTES
Post-Partum Day Number 2 Progress/Discharge Note    Patient doing well post-partum without significant complaint. Voiding without difficulty, normal lochia, positive flatus. Vitals:  Patient Vitals for the past 8 hrs:   BP Temp Pulse Resp SpO2   18 0659 124/63 98 °F (36.7 °C) 62 18 98 %     Temp (24hrs), Av.1 °F (36.7 °C), Min:98 °F (36.7 °C), Max:98.1 °F (36.7 °C)      Vital signs stable, afebrile. Exam:  Patient without distress. Abdomen soft, fundus firm at level of umbilicus, non tender               Perineum with normal lochia noted. Lower extremities are negative for swelling, cords or tenderness. Lab/Data Review: All lab results for the last 24 hours reviewed. Assessment and Plan:  Patient appears to be having uncomplicated post-partum course. Continue routine perineal care and maternal education. Plan discharge for today with follow up in our office in 6 weeks.

## 2020-12-03 PROBLEM — O09.33 LATE PRENATAL CARE AFFECTING PREGNANCY IN THIRD TRIMESTER: Status: ACTIVE | Noted: 2020-12-03

## 2020-12-03 PROBLEM — Z37.9 NORMAL LABOR: Status: RESOLVED | Noted: 2018-07-07 | Resolved: 2020-12-03

## 2020-12-03 PROBLEM — Z34.90 PREGNANCY: Status: RESOLVED | Noted: 2018-01-26 | Resolved: 2020-12-03

## 2020-12-03 PROBLEM — Z67.91 RH NEGATIVE STATE IN ANTEPARTUM PERIOD: Status: ACTIVE | Noted: 2020-12-03

## 2020-12-03 PROBLEM — O26.899 RH NEGATIVE STATUS DURING PREGNANCY: Status: RESOLVED | Noted: 2018-01-29 | Resolved: 2020-12-03

## 2020-12-03 PROBLEM — O26.899 RH NEGATIVE STATE IN ANTEPARTUM PERIOD: Status: ACTIVE | Noted: 2020-12-03

## 2020-12-03 PROBLEM — Z67.91 RH NEGATIVE STATUS DURING PREGNANCY: Status: RESOLVED | Noted: 2018-01-29 | Resolved: 2020-12-03

## 2020-12-03 PROBLEM — R06.02 SHORTNESS OF BREATH: Status: RESOLVED | Noted: 2018-07-08 | Resolved: 2020-12-03

## 2021-01-15 PROBLEM — O99.013 ANEMIA AFFECTING PREGNANCY IN THIRD TRIMESTER: Status: ACTIVE | Noted: 2021-01-15

## 2021-01-18 PROBLEM — O35.EXX0 FETAL HYDRONEPHROSIS DURING PREGNANCY, ANTEPARTUM: Status: ACTIVE | Noted: 2021-01-18

## 2021-01-18 PROBLEM — O09.93 HIGH-RISK PREGNANCY, THIRD TRIMESTER: Status: ACTIVE | Noted: 2018-01-26

## 2021-02-15 ENCOUNTER — HOSPITAL ENCOUNTER (OUTPATIENT)
Dept: ULTRASOUND IMAGING | Age: 31
Discharge: HOME | End: 2021-02-15
Attending: NURSE PRACTITIONER

## 2021-02-15 DIAGNOSIS — N13.30 HYDRONEPHROSIS, UNSPECIFIED HYDRONEPHROSIS TYPE: ICD-10-CM

## 2021-02-15 DIAGNOSIS — Z3A.35 35 WEEKS GESTATION OF PREGNANCY: ICD-10-CM
